# Patient Record
Sex: FEMALE | URBAN - METROPOLITAN AREA
[De-identification: names, ages, dates, MRNs, and addresses within clinical notes are randomized per-mention and may not be internally consistent; named-entity substitution may affect disease eponyms.]

---

## 2021-03-15 ENCOUNTER — NEW PATIENT (OUTPATIENT)
Dept: URBAN - METROPOLITAN AREA CLINIC 43 | Facility: CLINIC | Age: 41
End: 2021-03-15
Payer: MEDICAID

## 2021-03-15 DIAGNOSIS — H16.223 KERATOCONJUNCTIVITIS SICCA, BILATERAL: Primary | ICD-10-CM

## 2021-03-15 DIAGNOSIS — H40.013 OPEN ANGLE WITH BORDERLINE FINDINGS, LOW RISK, BILATERAL: ICD-10-CM

## 2021-03-15 PROCEDURE — 92134 CPTRZ OPH DX IMG PST SGM RTA: CPT | Performed by: OPTOMETRIST

## 2021-03-15 PROCEDURE — 99204 OFFICE O/P NEW MOD 45 MIN: CPT | Performed by: OPTOMETRIST

## 2021-03-15 ASSESSMENT — KERATOMETRY
OS: 40.50
OD: 39.63

## 2021-03-15 ASSESSMENT — INTRAOCULAR PRESSURE
OS: 15
OD: 15

## 2021-03-15 ASSESSMENT — VISUAL ACUITY
OS: 20/20
OD: 20/20

## 2021-04-08 ENCOUNTER — OFFICE VISIT (OUTPATIENT)
Dept: URBAN - METROPOLITAN AREA CLINIC 43 | Facility: CLINIC | Age: 41
End: 2021-04-08
Payer: MEDICAID

## 2021-04-08 PROCEDURE — 92083 EXTENDED VISUAL FIELD XM: CPT | Performed by: OPTOMETRIST

## 2021-04-08 PROCEDURE — 76514 ECHO EXAM OF EYE THICKNESS: CPT | Performed by: OPTOMETRIST

## 2021-04-08 PROCEDURE — 99213 OFFICE O/P EST LOW 20 MIN: CPT | Performed by: OPTOMETRIST

## 2021-04-08 PROCEDURE — 92133 CPTRZD OPH DX IMG PST SGM ON: CPT | Performed by: OPTOMETRIST

## 2021-04-08 ASSESSMENT — INTRAOCULAR PRESSURE
OS: 14
OD: 14

## 2021-04-08 NOTE — IMPRESSION/PLAN
Impression: Open angle with borderline findings, low risk, bilateral: H40.013.
+ family history: aunt s/p lasik OU: thin pachs OU Plan: IOP avg OU HVF today: reliable OU, clear fields OU
OCT RNFL today: no thinning OU
cont. to monitor without gtts CE/HVF/OCT RNFL 1 year

## 2021-05-24 NOTE — IMPRESSION/PLAN
Impression: Benign intracranial hypertension: G93.2. Plan: normal HVF and OCT RNFL, cont.  care with neurology pain/decreased ROM/decreased strength

## 2022-03-29 ENCOUNTER — OFFICE VISIT (OUTPATIENT)
Dept: URBAN - METROPOLITAN AREA CLINIC 43 | Facility: CLINIC | Age: 42
End: 2022-03-29
Payer: MEDICAID

## 2022-03-29 DIAGNOSIS — Z98.890 OTHER SPECIFIED POSTPROCEDURAL STATES: ICD-10-CM

## 2022-03-29 DIAGNOSIS — G93.2 BENIGN INTRACRANIAL HYPERTENSION: ICD-10-CM

## 2022-03-29 PROCEDURE — 92083 EXTENDED VISUAL FIELD XM: CPT | Performed by: OPTOMETRIST

## 2022-03-29 PROCEDURE — 92014 COMPRE OPH EXAM EST PT 1/>: CPT | Performed by: OPTOMETRIST

## 2022-03-29 PROCEDURE — 92133 CPTRZD OPH DX IMG PST SGM ON: CPT | Performed by: OPTOMETRIST

## 2022-03-29 ASSESSMENT — INTRAOCULAR PRESSURE
OD: 13
OS: 12

## 2022-03-29 ASSESSMENT — KERATOMETRY
OD: 39.63
OS: 40.13

## 2022-03-29 NOTE — IMPRESSION/PLAN
Impression: Open angle with borderline findings, low risk, bilateral: H40.013.
+ family history: aunt s/p lasik OU: thin pachs OU Plan: IOP avg OU HVF today: reliable, OD: few scattered defects, OS: scattered defects OCT RNFL today: no thinning
cont.  to monitor without gtts
return 1 year CE/HVF/OCT RNFL

## 2022-03-29 NOTE — IMPRESSION/PLAN
Impression: Benign intracranial hypertension: G93.2. Plan: normal HVF and OCT RNFL, cont.  care with neurology

## 2023-03-29 ENCOUNTER — OFFICE VISIT (OUTPATIENT)
Dept: URBAN - METROPOLITAN AREA CLINIC 43 | Facility: CLINIC | Age: 43
End: 2023-03-29
Payer: MEDICAID

## 2023-03-29 DIAGNOSIS — G93.2 BENIGN INTRACRANIAL HYPERTENSION: ICD-10-CM

## 2023-03-29 DIAGNOSIS — Z98.890 OTHER SPECIFIED POSTPROCEDURAL STATES: ICD-10-CM

## 2023-03-29 DIAGNOSIS — H16.223 KERATOCONJUNCTIVITIS SICCA, BILATERAL: ICD-10-CM

## 2023-03-29 DIAGNOSIS — H40.013 OPEN ANGLE WITH BORDERLINE FINDINGS, LOW RISK, BILATERAL: Primary | ICD-10-CM

## 2023-03-29 PROCEDURE — 92083 EXTENDED VISUAL FIELD XM: CPT | Performed by: OPTOMETRIST

## 2023-03-29 PROCEDURE — 92014 COMPRE OPH EXAM EST PT 1/>: CPT | Performed by: OPTOMETRIST

## 2023-03-29 PROCEDURE — 92133 CPTRZD OPH DX IMG PST SGM ON: CPT | Performed by: OPTOMETRIST

## 2023-03-29 ASSESSMENT — INTRAOCULAR PRESSURE
OD: 14
OS: 14

## 2023-03-29 ASSESSMENT — KERATOMETRY
OS: 40.25
OD: 39.75

## 2023-03-29 NOTE — IMPRESSION/PLAN
Impression: Benign intracranial hypertension: G93.2.  Plan: normal HVF and OCT RNFL, no signs of optic nerve head edema or atrophy, cont care with neurology

## 2023-03-29 NOTE — IMPRESSION/PLAN
Impression: Open angle with borderline findings, low risk, bilateral: H40.013.
+ family history: aunt s/p lasik OU: thin pachs OU Plan: IOP avg OU HVF today: reliable, OD: few inf defects, OS: mostly clear OCT RNFL today: no thinning
cont.  to monitor without gtts
return 1 year CE/HVF/OCT RNFL

## 2023-03-29 NOTE — IMPRESSION/PLAN
Impression: Keratoconjunctivitis sicca, bilateral: M07.344. Plan: Recommend artificial tears at least 4 times a day and gel drop or tear ointment at bedtime.

## 2024-03-12 ENCOUNTER — OFFICE VISIT (OUTPATIENT)
Dept: URBAN - METROPOLITAN AREA CLINIC 43 | Facility: CLINIC | Age: 44
End: 2024-03-12
Payer: MEDICAID

## 2024-03-12 DIAGNOSIS — H16.223 KERATOCONJUNCTIVITIS SICCA, BILATERAL: ICD-10-CM

## 2024-03-12 DIAGNOSIS — Z98.890 OTHER SPECIFIED POSTPROCEDURAL STATES: ICD-10-CM

## 2024-03-12 DIAGNOSIS — G93.2 BENIGN INTRACRANIAL HYPERTENSION: ICD-10-CM

## 2024-03-12 DIAGNOSIS — H40.013 OPEN ANGLE WITH BORDERLINE FINDINGS, LOW RISK, BILATERAL: Primary | ICD-10-CM

## 2024-03-12 PROCEDURE — 92083 EXTENDED VISUAL FIELD XM: CPT | Performed by: OPTOMETRIST

## 2024-03-12 PROCEDURE — 92014 COMPRE OPH EXAM EST PT 1/>: CPT | Performed by: OPTOMETRIST

## 2024-03-12 PROCEDURE — 92133 CPTRZD OPH DX IMG PST SGM ON: CPT | Performed by: OPTOMETRIST

## 2024-03-12 ASSESSMENT — INTRAOCULAR PRESSURE
OD: 14
OS: 14